# Patient Record
Sex: FEMALE | Race: WHITE | Employment: FULL TIME | URBAN - METROPOLITAN AREA
[De-identification: names, ages, dates, MRNs, and addresses within clinical notes are randomized per-mention and may not be internally consistent; named-entity substitution may affect disease eponyms.]

---

## 2017-10-24 PROBLEM — K44.9 HIATAL HERNIA: Status: ACTIVE | Noted: 2017-10-24

## 2018-01-10 PROBLEM — E78.1 HYPERTRIGLYCERIDEMIA: Status: ACTIVE | Noted: 2018-01-10

## 2018-06-22 ENCOUNTER — HOSPITAL ENCOUNTER (OUTPATIENT)
Dept: MAMMOGRAPHY | Age: 38
Discharge: HOME | End: 2018-06-22
Attending: FAMILY MEDICINE
Payer: COMMERCIAL

## 2018-06-22 DIAGNOSIS — Z12.39 SCREENING FOR BREAST CANCER: ICD-10-CM

## 2018-06-22 PROCEDURE — 77067 SCR MAMMO BI INCL CAD: CPT

## 2019-09-22 ENCOUNTER — APPOINTMENT (OUTPATIENT)
Dept: GENERAL RADIOLOGY | Age: 39
End: 2019-09-22
Attending: EMERGENCY MEDICINE
Payer: COMMERCIAL

## 2019-09-22 ENCOUNTER — HOSPITAL ENCOUNTER (EMERGENCY)
Age: 39
Discharge: HOME | End: 2019-09-22
Attending: STUDENT IN AN ORGANIZED HEALTH CARE EDUCATION/TRAINING PROGRAM
Payer: COMMERCIAL

## 2019-09-22 VITALS
HEART RATE: 87 BPM | DIASTOLIC BLOOD PRESSURE: 83 MMHG | BODY MASS INDEX: 28.61 KG/M2 | TEMPERATURE: 97.9 F | WEIGHT: 178 LBS | OXYGEN SATURATION: 98 % | RESPIRATION RATE: 18 BRPM | HEIGHT: 66 IN | SYSTOLIC BLOOD PRESSURE: 121 MMHG

## 2019-09-22 DIAGNOSIS — R07.9 CHEST PAIN, UNSPECIFIED TYPE: Primary | ICD-10-CM

## 2019-09-22 LAB
ALBUMIN SERPL-MCNC: 3.8 G/DL (ref 3.5–5)
ALBUMIN/GLOB SERPL: 1 {RATIO} (ref 1.2–3.5)
ALP SERPL-CCNC: 142 U/L (ref 50–136)
ALT SERPL-CCNC: 26 U/L (ref 12–65)
ANION GAP SERPL CALC-SCNC: 5 MMOL/L (ref 7–16)
AST SERPL-CCNC: 21 U/L (ref 15–37)
ATRIAL RATE: 80 BPM
BASOPHILS # BLD: 0.1 K/UL (ref 0–0.2)
BASOPHILS NFR BLD: 1 % (ref 0–2)
BILIRUB SERPL-MCNC: 0.2 MG/DL (ref 0.2–1.1)
BUN SERPL-MCNC: 13 MG/DL (ref 6–23)
CALCIUM SERPL-MCNC: 9 MG/DL (ref 8.3–10.4)
CALCULATED P AXIS, ECG09: 24 DEGREES
CALCULATED R AXIS, ECG10: 28 DEGREES
CALCULATED T AXIS, ECG11: 46 DEGREES
CHLORIDE SERPL-SCNC: 109 MMOL/L (ref 98–107)
CO2 SERPL-SCNC: 27 MMOL/L (ref 21–32)
CREAT SERPL-MCNC: 0.76 MG/DL (ref 0.6–1)
DIAGNOSIS, 93000: NORMAL
DIFFERENTIAL METHOD BLD: ABNORMAL
EOSINOPHIL # BLD: 0 K/UL (ref 0–0.8)
EOSINOPHIL NFR BLD: 0 % (ref 0.5–7.8)
ERYTHROCYTE [DISTWIDTH] IN BLOOD BY AUTOMATED COUNT: 12.7 % (ref 11.9–14.6)
GLOBULIN SER CALC-MCNC: 3.7 G/DL (ref 2.3–3.5)
GLUCOSE SERPL-MCNC: 89 MG/DL (ref 65–100)
HCT VFR BLD AUTO: 41.5 % (ref 35.8–46.3)
HGB BLD-MCNC: 13.9 G/DL (ref 11.7–15.4)
IMM GRANULOCYTES # BLD AUTO: 0 K/UL (ref 0–0.5)
IMM GRANULOCYTES NFR BLD AUTO: 0 % (ref 0–5)
LYMPHOCYTES # BLD: 2.9 K/UL (ref 0.5–4.6)
LYMPHOCYTES NFR BLD: 41 % (ref 13–44)
MCH RBC QN AUTO: 29.1 PG (ref 26.1–32.9)
MCHC RBC AUTO-ENTMCNC: 33.5 G/DL (ref 31.4–35)
MCV RBC AUTO: 87 FL (ref 79.6–97.8)
MONOCYTES # BLD: 0.5 K/UL (ref 0.1–1.3)
MONOCYTES NFR BLD: 7 % (ref 4–12)
NEUTS SEG # BLD: 3.6 K/UL (ref 1.7–8.2)
NEUTS SEG NFR BLD: 50 % (ref 43–78)
NRBC # BLD: 0 K/UL (ref 0–0.2)
P-R INTERVAL, ECG05: 150 MS
PLATELET # BLD AUTO: 308 K/UL (ref 150–450)
PMV BLD AUTO: 8.2 FL (ref 9.4–12.3)
POTASSIUM SERPL-SCNC: 3.9 MMOL/L (ref 3.5–5.1)
PROT SERPL-MCNC: 7.5 G/DL (ref 6.3–8.2)
Q-T INTERVAL, ECG07: 378 MS
QRS DURATION, ECG06: 80 MS
QTC CALCULATION (BEZET), ECG08: 435 MS
RBC # BLD AUTO: 4.77 M/UL (ref 4.05–5.2)
SODIUM SERPL-SCNC: 141 MMOL/L (ref 136–145)
TROPONIN I SERPL-MCNC: <0.02 NG/ML (ref 0.02–0.05)
TROPONIN I SERPL-MCNC: <0.02 NG/ML (ref 0.02–0.05)
VENTRICULAR RATE, ECG03: 80 BPM
WBC # BLD AUTO: 7.1 K/UL (ref 4.3–11.1)

## 2019-09-22 PROCEDURE — 93005 ELECTROCARDIOGRAM TRACING: CPT | Performed by: STUDENT IN AN ORGANIZED HEALTH CARE EDUCATION/TRAINING PROGRAM

## 2019-09-22 PROCEDURE — 74011250636 HC RX REV CODE- 250/636: Performed by: STUDENT IN AN ORGANIZED HEALTH CARE EDUCATION/TRAINING PROGRAM

## 2019-09-22 PROCEDURE — 93005 ELECTROCARDIOGRAM TRACING: CPT | Performed by: EMERGENCY MEDICINE

## 2019-09-22 PROCEDURE — 99285 EMERGENCY DEPT VISIT HI MDM: CPT | Performed by: STUDENT IN AN ORGANIZED HEALTH CARE EDUCATION/TRAINING PROGRAM

## 2019-09-22 PROCEDURE — 96361 HYDRATE IV INFUSION ADD-ON: CPT | Performed by: STUDENT IN AN ORGANIZED HEALTH CARE EDUCATION/TRAINING PROGRAM

## 2019-09-22 PROCEDURE — 96374 THER/PROPH/DIAG INJ IV PUSH: CPT | Performed by: STUDENT IN AN ORGANIZED HEALTH CARE EDUCATION/TRAINING PROGRAM

## 2019-09-22 PROCEDURE — 74011250637 HC RX REV CODE- 250/637: Performed by: STUDENT IN AN ORGANIZED HEALTH CARE EDUCATION/TRAINING PROGRAM

## 2019-09-22 PROCEDURE — 71046 X-RAY EXAM CHEST 2 VIEWS: CPT

## 2019-09-22 PROCEDURE — 81003 URINALYSIS AUTO W/O SCOPE: CPT | Performed by: STUDENT IN AN ORGANIZED HEALTH CARE EDUCATION/TRAINING PROGRAM

## 2019-09-22 PROCEDURE — 85025 COMPLETE CBC W/AUTO DIFF WBC: CPT

## 2019-09-22 PROCEDURE — 84484 ASSAY OF TROPONIN QUANT: CPT

## 2019-09-22 PROCEDURE — 80053 COMPREHEN METABOLIC PANEL: CPT

## 2019-09-22 RX ORDER — ONDANSETRON 2 MG/ML
4 INJECTION INTRAMUSCULAR; INTRAVENOUS
Status: COMPLETED | OUTPATIENT
Start: 2019-09-22 | End: 2019-09-22

## 2019-09-22 RX ORDER — IBUPROFEN 800 MG/1
800 TABLET ORAL
Status: COMPLETED | OUTPATIENT
Start: 2019-09-22 | End: 2019-09-22

## 2019-09-22 RX ADMIN — IBUPROFEN 800 MG: 800 TABLET, FILM COATED ORAL at 19:12

## 2019-09-22 RX ADMIN — SODIUM CHLORIDE 1000 ML: 900 INJECTION, SOLUTION INTRAVENOUS at 19:35

## 2019-09-22 RX ADMIN — ONDANSETRON 4 MG: 2 INJECTION INTRAMUSCULAR; INTRAVENOUS at 19:35

## 2019-09-22 NOTE — ED TRIAGE NOTES
Patient comes in via EMS for sob x2 weeks and cp/jaw pain x1 week. EMS placed 1 in nitro paste to chest. Patient states that relieved both sob and cp. Denies pain at this time.  HTN in triage with no history

## 2019-09-22 NOTE — ED PROVIDER NOTES
78-year-old female patient presents to the emergency department with reports of intermittent jaw/neck pain, chest heaviness, shortness of breath and some discomfort in her left arm. The symptoms have been present and intermittent over the past week. She denies any obvious alleviating or exacerbating factors. Chest discomfort is described as a pressure-like sensation that comes at random. Denies previously similar symptoms or previous diagnosis of heart disease. Received some nitroglycerin paste from EMS providers with improvement in her discomfort. She is also been given aspirin. Is no associated nausea, vomiting or diaphoresis. She denies any other symptoms such as fever, chills, abdominal pain or changes in bowel or bladder habits. The history is provided by the patient. No  was used. Shortness of Breath   This is a new problem. The current episode started more than 1 week ago. The problem has been gradually improving. Associated symptoms include neck pain. Pertinent negatives include no fever, no headaches, no sore throat, no cough, no wheezing, no chest pain, no vomiting, no abdominal pain, no rash and no leg swelling. Treatments tried: Nitroglycerin. The treatment provided moderate relief.         Past Medical History:   Diagnosis Date    Hiatal hernia 10/24/2017       Past Surgical History:   Procedure Laterality Date    HX GYN      HX HYSTERECTOMY      HX RECTOCELE REPAIR           Family History:   Problem Relation Age of Onset    Diabetes Mother     Hypertension Mother     Elevated Lipids Mother     Diabetes Father     Hypertension Father     Cancer Father     Cancer Maternal Aunt         breast cancer    Breast Cancer Maternal Aunt        Social History     Socioeconomic History    Marital status:      Spouse name: Not on file    Number of children: Not on file    Years of education: Not on file    Highest education level: Not on file   Occupational History    Not on file   Social Needs    Financial resource strain: Not on file    Food insecurity:     Worry: Not on file     Inability: Not on file    Transportation needs:     Medical: Not on file     Non-medical: Not on file   Tobacco Use    Smoking status: Former Smoker    Smokeless tobacco: Former User   Substance and Sexual Activity    Alcohol use: No    Drug use: No    Sexual activity: Yes     Partners: Male     Birth control/protection: Surgical   Lifestyle    Physical activity:     Days per week: Not on file     Minutes per session: Not on file    Stress: Not on file   Relationships    Social connections:     Talks on phone: Not on file     Gets together: Not on file     Attends Confucianist service: Not on file     Active member of club or organization: Not on file     Attends meetings of clubs or organizations: Not on file     Relationship status: Not on file    Intimate partner violence:     Fear of current or ex partner: Not on file     Emotionally abused: Not on file     Physically abused: Not on file     Forced sexual activity: Not on file   Other Topics Concern    Not on file   Social History Narrative    Not on file         ALLERGIES: Benadryl allergy decongestant; Codeine; Erythromycin; Meperidine hcl; and Pcn [penicillins]    Review of Systems   Constitutional: Negative for chills, diaphoresis and fever. HENT: Negative for congestion, sneezing and sore throat. Eyes: Negative for visual disturbance. Respiratory: Positive for shortness of breath. Negative for cough, chest tightness and wheezing. Cardiovascular: Negative for chest pain and leg swelling. Gastrointestinal: Negative for abdominal pain, blood in stool, diarrhea, nausea and vomiting. Endocrine: Negative for polyuria. Genitourinary: Negative for difficulty urinating, dysuria, flank pain, hematuria and urgency. Musculoskeletal: Positive for neck pain. Negative for back pain, myalgias and neck stiffness. Skin: Negative for color change and rash. Neurological: Negative for dizziness, syncope, speech difficulty, weakness, light-headedness, numbness and headaches. Psychiatric/Behavioral: Negative for behavioral problems. All other systems reviewed and are negative. Vitals:    09/22/19 1511 09/22/19 1656   BP: (!) 140/107 137/86   Pulse: 82    Resp: 16    Temp: 97.9 °F (36.6 °C)    SpO2: 97% 97%   Weight: 80.7 kg (178 lb)    Height: 5' 6\" (1.676 m)             Physical Exam   Constitutional: She is oriented to person, place, and time. She appears well-developed and well-nourished. No distress. Alert and oriented to person place and time. No acute distress, speaks in clear, fluid sentences. HENT:   Head: Normocephalic and atraumatic. Right Ear: External ear normal.   Left Ear: External ear normal.   Nose: Nose normal.   Eyes: Pupils are equal, round, and reactive to light. EOM are normal.   Neck: Normal range of motion. Cardiovascular: Normal rate, regular rhythm, normal heart sounds and intact distal pulses. Exam reveals no gallop and no friction rub. No murmur heard. Pulmonary/Chest: Effort normal and breath sounds normal. No stridor. No respiratory distress. She has no decreased breath sounds. She has no wheezes. She has no rhonchi. She has no rales. She exhibits no tenderness. Abdominal: Soft. She exhibits no distension and no mass. There is no tenderness. There is no rebound and no guarding. No hernia. Musculoskeletal: Normal range of motion. She exhibits no edema, tenderness or deformity. Neurological: She is alert and oriented to person, place, and time. No cranial nerve deficit. Skin: Skin is warm and dry. She is not diaphoretic. Nursing note and vitals reviewed.        MDM  Number of Diagnoses or Management Options  Chest pain, unspecified type: new and requires workup  Diagnosis management comments: Age > 50: NO  HR ? 100: NO  O2 Sat on Room Air < 95%:  NO  Prior History of DVT/PE:NO  Recent Trauma or Surgery: NO  Hemoptysis: NO  Exogenous Estrogen: NO  Unilateral Leg Swelling: NO    If all questions are answered no then pt is PERC negative and is very low risk for PE. No further testing, or imaging is indicated. [address] [facility] A [facility]22/2019  ===================================================================    Patient's pain is improved at this point. She has nitroglycerin paste in place. Initial troponin within normal limits. EKG obtained on arrival shows a normal sinus rhythm without evidence of ischemia. Low suspicion for acute cardiac cause of patient's pain as it has been intermittent in nature for approximately 1 week. Her initial labs are reassuring. She has no diagnosed history of heart disease. She is unsure of family history. Discussed patient case with on-call cardiologist who agrees that patient may follow-up in the office. Discussed this plan of care with patient and family voiced understanding and agreement. Call placed to [address] [place] [address] [address] Card[address] referral line to arrange this appointment. Patient states she has now feeling lightheaded, nauseous and generally ill. Suspect nitro as a cause of patient's symptoms. This is subsequently been removed from patient's chest per . Notified patient's RN who verify that all Nitropaste is gone. She is slightly hypotensive at 515 systolically. Orders placed for Zofran and a liter fluids. We will continue to monitor here.  7:49 PM         Amount and/or Complexity of Data Reviewed  Clinical lab tests: ordered and reviewed  Tests in the radiology section of CPT®: ordered and reviewed  Tests in the medicine section of CPT®: ordered and reviewed  Discuss the patient with other providers: yes  Independent visualization of images, tracings, or specimens: yes    Risk of Complications, Morbidity, and/or Mortality  Presenting problems: moderate  Diagnostic procedures: low  Management options: moderate    Patient Progress  Patient progress: stable         Procedures

## 2019-09-22 NOTE — DISCHARGE INSTRUCTIONS

## 2019-09-23 LAB
ATRIAL RATE: 85 BPM
CALCULATED P AXIS, ECG09: 52 DEGREES
CALCULATED R AXIS, ECG10: 43 DEGREES
CALCULATED T AXIS, ECG11: 55 DEGREES
DIAGNOSIS, 93000: NORMAL
P-R INTERVAL, ECG05: 152 MS
Q-T INTERVAL, ECG07: 356 MS
QRS DURATION, ECG06: 80 MS
QTC CALCULATION (BEZET), ECG08: 423 MS
VENTRICULAR RATE, ECG03: 85 BPM

## 2019-09-23 NOTE — ED NOTES
I have reviewed discharge instructions with the patient. The patient verbalized understanding. Patient left ED via Discharge Method: ambulatory to Home with . Opportunity for questions and clarification provided. Patient given 0 scripts. To continue your aftercare when you leave the hospital, you may receive an automated call from our care team to check in on how you are doing. This is a free service and part of our promise to provide the best care and service to meet your aftercare needs.  If you have questions, or wish to unsubscribe from this service please call [phone]. Thank you for Choosing our [facility] Emergency Department.

## 2019-11-17 RX ORDER — IBUPROFEN 200 MG
CAPSULE ORAL AS NEEDED
COMMUNITY

## 2019-11-18 ENCOUNTER — HOSPITAL ENCOUNTER (OUTPATIENT)
Dept: CARDIAC CATH/INVASIVE PROCEDURES | Age: 39
Discharge: HOME | End: 2019-11-18
Attending: INTERNAL MEDICINE | Admitting: INTERNAL MEDICINE
Payer: COMMERCIAL

## 2019-11-18 VITALS
BODY MASS INDEX: 29.25 KG/M2 | OXYGEN SATURATION: 99 % | WEIGHT: 182 LBS | TEMPERATURE: 98.1 F | SYSTOLIC BLOOD PRESSURE: 138 MMHG | RESPIRATION RATE: 16 BRPM | HEIGHT: 66 IN | HEART RATE: 74 BPM | DIASTOLIC BLOOD PRESSURE: 81 MMHG

## 2019-11-18 LAB
ATRIAL RATE: 70 BPM
CALCULATED P AXIS, ECG09: 30 DEGREES
CALCULATED R AXIS, ECG10: 26 DEGREES
CALCULATED T AXIS, ECG11: 47 DEGREES
DIAGNOSIS, 93000: NORMAL
P-R INTERVAL, ECG05: 152 MS
Q-T INTERVAL, ECG07: 404 MS
QRS DURATION, ECG06: 80 MS
QTC CALCULATION (BEZET), ECG08: 436 MS
VENTRICULAR RATE, ECG03: 70 BPM

## 2019-11-18 PROCEDURE — 93451 RIGHT HEART CATH: CPT

## 2019-11-18 PROCEDURE — 74011250637 HC RX REV CODE- 250/637: Performed by: INTERNAL MEDICINE

## 2019-11-18 PROCEDURE — C1894 INTRO/SHEATH, NON-LASER: HCPCS

## 2019-11-18 PROCEDURE — 99152 MOD SED SAME PHYS/QHP 5/>YRS: CPT

## 2019-11-18 PROCEDURE — 74011000250 HC RX REV CODE- 250: Performed by: INTERNAL MEDICINE

## 2019-11-18 PROCEDURE — 77030029997 HC DEV COM RDL R BND TELE -B

## 2019-11-18 PROCEDURE — 77030040934 HC CATH DIAG DXTERITY MEDT -A

## 2019-11-18 PROCEDURE — 74011636320 HC RX REV CODE- 636/320: Performed by: INTERNAL MEDICINE

## 2019-11-18 PROCEDURE — 93005 ELECTROCARDIOGRAM TRACING: CPT | Performed by: INTERNAL MEDICINE

## 2019-11-18 PROCEDURE — 93458 L HRT ARTERY/VENTRICLE ANGIO: CPT

## 2019-11-18 PROCEDURE — C1751 CATH, INF, PER/CENT/MIDLINE: HCPCS

## 2019-11-18 PROCEDURE — C1769 GUIDE WIRE: HCPCS

## 2019-11-18 PROCEDURE — 74011250636 HC RX REV CODE- 250/636: Performed by: INTERNAL MEDICINE

## 2019-11-18 PROCEDURE — 99153 MOD SED SAME PHYS/QHP EA: CPT

## 2019-11-18 PROCEDURE — 93460 R&L HRT ART/VENTRICLE ANGIO: CPT

## 2019-11-18 RX ORDER — IBUPROFEN 600 MG/1
600 TABLET ORAL ONCE
Status: COMPLETED | OUTPATIENT
Start: 2019-11-18 | End: 2019-11-18

## 2019-11-18 RX ORDER — HEPARIN SODIUM 200 [USP'U]/100ML
3 INJECTION, SOLUTION INTRAVENOUS CONTINUOUS
Status: DISCONTINUED | OUTPATIENT
Start: 2019-11-18 | End: 2019-11-18 | Stop reason: HOSPADM

## 2019-11-18 RX ORDER — ONDANSETRON 2 MG/ML
4 INJECTION INTRAMUSCULAR; INTRAVENOUS ONCE
Status: COMPLETED | OUTPATIENT
Start: 2019-11-18 | End: 2019-11-18

## 2019-11-18 RX ORDER — MIDAZOLAM HYDROCHLORIDE 1 MG/ML
.5-2 INJECTION, SOLUTION INTRAMUSCULAR; INTRAVENOUS
Status: DISCONTINUED | OUTPATIENT
Start: 2019-11-18 | End: 2019-11-18 | Stop reason: HOSPADM

## 2019-11-18 RX ORDER — SODIUM CHLORIDE 9 MG/ML
75 INJECTION, SOLUTION INTRAVENOUS CONTINUOUS
Status: DISCONTINUED | OUTPATIENT
Start: 2019-11-18 | End: 2019-11-18 | Stop reason: HOSPADM

## 2019-11-18 RX ORDER — LIDOCAINE HYDROCHLORIDE 10 MG/ML
2-20 INJECTION INFILTRATION; PERINEURAL ONCE
Status: COMPLETED | OUTPATIENT
Start: 2019-11-18 | End: 2019-11-18

## 2019-11-18 RX ORDER — GUAIFENESIN 100 MG/5ML
81-324 LIQUID (ML) ORAL ONCE
Status: DISCONTINUED | OUTPATIENT
Start: 2019-11-18 | End: 2019-11-18 | Stop reason: HOSPADM

## 2019-11-18 RX ADMIN — LIDOCAINE HYDROCHLORIDE 3 ML: 10 INJECTION, SOLUTION INFILTRATION; PERINEURAL at 14:00

## 2019-11-18 RX ADMIN — IBUPROFEN 600 MG: 600 TABLET, FILM COATED ORAL at 15:40

## 2019-11-18 RX ADMIN — MIDAZOLAM 2 MG: 1 INJECTION INTRAMUSCULAR; INTRAVENOUS at 14:08

## 2019-11-18 RX ADMIN — IOPAMIDOL 40 ML: 755 INJECTION, SOLUTION INTRAVENOUS at 14:30

## 2019-11-18 RX ADMIN — HEPARIN SODIUM 3 ML/HR: 5000 INJECTION, SOLUTION INTRAVENOUS; SUBCUTANEOUS at 14:08

## 2019-11-18 RX ADMIN — SODIUM CHLORIDE 75 ML/HR: 900 INJECTION, SOLUTION INTRAVENOUS at 13:16

## 2019-11-18 RX ADMIN — HEPARIN SODIUM 2 ML: 10000 INJECTION, SOLUTION INTRAVENOUS; SUBCUTANEOUS at 14:08

## 2019-11-18 RX ADMIN — ONDANSETRON 4 MG: 2 INJECTION INTRAMUSCULAR; INTRAVENOUS at 16:29

## 2019-11-18 NOTE — PROGRESS NOTES
Report received from [address] [place] [address]. Procedural findings communicated. Intra procedural  medication administration reviewed. Progression of care discussed.      Patient received into [address] [facility] 4 post sheath removal.     Access site without bleeding or swelling yes    Dressing dry and intact yes    Patient instructed to limit movement to right upper extremity    Routine post procedural vital signs and site assessment initiated yes

## 2019-11-18 NOTE — PROGRESS NOTES
Patient pre-assessment complete for [facility] poss with DR Valencia scheduled for 19 at 12:30pm, arrival time 10:30am. Patient verified using . Patient instructed to bring all home medications in labeled bottles on the day of procedure. NPO status reinforced. Patient informed to take a full dose aspirin 325mg  or 81 mg x 4 on the day of procedure. Instructed they can take all other medications excluding vitamins & supplements. Patient verbalizes understanding of all instructions & denies any questions at this time.

## 2019-11-18 NOTE — PROGRESS NOTES
Pt arrived, ambulated to room with no visible problems, planned C for Dr Shari Peabody. Consent signed, Procedure discussed with pt all questions answered voiced understanding. Medications and history discussed with pt. Pt prepped per ordersThe patient has a fraility score of 3-MANAGING WELL, based on ability to complete ADLs without assistance, increased symptoms on exertion.       Patient took Aspirin 324mg today at 1000 prior to arrival.

## 2019-11-18 NOTE — DISCHARGE INSTRUCTIONS
HEART CATHETERIZATION/ANGIOGRAPHY DISCHARGE INSTRUCTIONS    1. Check puncture site frequently for swelling or bleeding. If there is any bleeding, lie down and apply pressure over the area with a clean towel or washcloth. Notify your doctor for any redness, swelling, drainage, or oozing from the puncture site. Notify your doctor for any fever or chills. 2. If the extremity becomes cold, numb, or painful call Dr. Eder Garcia at [phone].  3. Activity should be limited for the next 48 hours. Do not use your right arm for lifting, pushing, or pulling during this time. 4. You may resume your usual diet. Drink more fluids than usual.  5. Have a responsible person drive you home and stay with you for at least 24 hours after your heart catheterization/angiography. 6. You may remove bandages from your Right arm in 24 hours. You may shower in 24 hours. No tub baths, hot tubs, or swimming for 1 week. Do not place any lotions, creams, powders, or ointments over puncture site for 1 week. You may place a clean band-aid over the puncture site each day for 5 days. Change daily. 7. Do not drive for 24 hours. I have read the above instructions and have had the opportunity to ask questions.       Patient: ________________________   Date: 11/18/2019    Witness: _______________________   Date: 11/18/2019

## 2019-11-18 NOTE — PROCEDURES
Cardiac Catheterization Procedure Note    Patient ID:     Name: Dreama Hashimoto   Medical Record Number: [id number]   YOB: 1980    Date of Procedure: 11/18/2019     Pre-procedure Diagnosis:  Typical Angina    Post-procedure Diagnosis: Non-cardiac Chest Pain    Reason for Procedure: Suspected CAD    Blood loss less than 5 ml    Sedation. Pt received 2 mg versed and 0 mcg fentanyl for monitored conscious sedation from 200to 230. Nurse broderick    Specimen: None    No complications    No assistants    Time out, Mallampati, and ASA performed    Procedure:  After informed consent, patient was prepped and draped in the usual sterile fashion. radial approach was used. 70cc Visipaque contrast were utilized for the entire procedure.  no closure device used        FINDINGS    Left Ventricle: 65  LVEDP: 18    Left Main:normal    Left Anterior descending coronary artery: normal       Left Circumflex coronary artery: normal        Right coronary artery: normal            Graft anatomy: na    Intervention if done: na    Conclusions: right heart cath normal    Recommentations: med tx    No complications      Signed By: Willis Urbina MD

## 2019-11-18 NOTE — PROGRESS NOTES
Discharged to home accompanied by . Left unit via wheelchair. Right radial and brachial sites without bleeding or hematoma.

## 2019-11-18 NOTE — PROGRESS NOTES
R band deflation completed. Right radial dressed with sterile gauze covered with tegaderm using sterile technique. No bleeding or hematoma. Tolerated without difficulty  Discharge instructions given per orders, voiced good understanding of right radial and brachial care, medications & follow up care.  Denies any questions

## 2019-11-18 NOTE — PROGRESS NOTES
TRANSFER - OUT REPORT:    Verbal report given to Yazmin Brown RN(name) on Carry Florina  being transferred to CPRU(unit) for routine progression of care       Report consisted of patients Situation, Background, Assessment and   Recommendations(SBAR). Information from the following report(s) SBAR was reviewed with the receiving nurse.     C and C w/ Dr. Sav Manzo  No interventions  2 mg versed   7 Fr venoush sheath R brachial   R radial TR band 12 mls

## 2019-11-18 NOTE — PROGRESS NOTES
Unable to obtain complete pain relief from headache. States she usually only takes ibuprofen but thinks the headache started due to not being able to eat until later in the day.

## 2019-11-19 NOTE — PROCEDURES
[facility]  CARDIAC CATH    Name:  Trevon Dietz  MR#:  [id number]  :  1980  ACCOUNT #:  [de-identified]  DATE OF SERVICE:  2019    REFERRING PHYSICIAN:  Colten Brady MD    PROCEDURES PERFORMED:  Left heart cath, right heart cath, selective coronary angiography, left ventriculogram.    PREOPERATIVE DIAGNOSES:  Shortness of breath, chest pain. POSTOPERATIVE DIAGNOSIS:  Noncardiac symptoms. SURGEON:  Yolette Quiles MD    ASSISTANT:  None. ESTIMATED BLOOD LOSS:  3 mL    SPECIMENS REMOVED:  None. COMPLICATIONS:  none    IMPLANTS:  None. ANESTHESIA:  Sedation 2 mg of Versed. Anesthesia 2 mg of Versed given between 2:00 and 2:30 p.m. by Stephanie Garza RN. INDICATION:  Shortness of breath. ACCESS:  Right radial and right brachial.    CONTRAST:  70 mL    CATHETERS USED:  TIG-4 and [place] were used. FINDINGS:  Left heart cath shows LV gram 65% to 70% for an EF with LVEDP of 18. No gradient on pullback. Left main arises normally, bifurcates into LAD and circumflex. Left main is moderate size and normal.    LAD courses to the apex, supplies two diagonals. The LAD and diagonals are moderate-caliber vessels that are smooth and have no atherosclerotic disease. The LAD wraps to the apex. Circumflex artery in the AV groove is nondominant but supplies two OMs. The circ system is moderate to large size and is angiographically normal.    Right coronary artery is a large dominant artery arising off the right cusp, coursing in the AV groove, supplying an RV branch, posterolateral and a posterior descending. The right system is angiographically normal.    Right heart cath shows the following:  RA pressure:  A-wave 13, V-wave 12, mean of 10. RV pressure:  Systolic 30, diastolic 12. PA pressure:  Systolic 23, diastolic 12. Pulmonary capillary wedge pressure:  A-wave 17, V-wave 17, mean of 15. LV pressure 133/48. AO pressure 130/113.     LVEDP of 18.    Cardiac output was approximately 5.6 liters per minute. Saturations:  Right atrial saturation 76%. PA sat 76%. CONCLUSIONS:  Normal left and right heart catheterization with normal left ventricular systolic function. Continue medical therapy.       MD GISELLE [id number]  D:  11/18/2019 14:57  T:  11/19/2019 8:01  JOB #:  [id number]

## 2019-11-27 ENCOUNTER — HOSPITAL ENCOUNTER (OUTPATIENT)
Dept: MAMMOGRAPHY | Age: 39
Discharge: HOME | End: 2019-11-27
Attending: FAMILY MEDICINE
Payer: COMMERCIAL

## 2019-11-27 DIAGNOSIS — Z12.31 VISIT FOR SCREENING MAMMOGRAM: ICD-10-CM

## 2019-11-27 PROCEDURE — 77063 BREAST TOMOSYNTHESIS BI: CPT

## 2019-12-10 ENCOUNTER — HOSPITAL ENCOUNTER (OUTPATIENT)
Dept: GENERAL RADIOLOGY | Age: 39
Discharge: HOME | End: 2019-12-10
Payer: COMMERCIAL

## 2019-12-10 DIAGNOSIS — R06.02 SOB (SHORTNESS OF BREATH): ICD-10-CM

## 2019-12-10 PROCEDURE — 71046 X-RAY EXAM CHEST 2 VIEWS: CPT

## 2021-01-13 ENCOUNTER — TRANSCRIBE ORDER (OUTPATIENT)
Dept: SCHEDULING | Age: 41
End: 2021-01-13

## 2021-01-13 DIAGNOSIS — Z12.31 SCREENING MAMMOGRAM FOR HIGH-RISK PATIENT: Primary | ICD-10-CM

## 2021-01-21 ENCOUNTER — HOSPITAL ENCOUNTER (OUTPATIENT)
Dept: MAMMOGRAPHY | Age: 41
Discharge: HOME | End: 2021-01-21
Attending: OBSTETRICS & GYNECOLOGY
Payer: COMMERCIAL

## 2021-01-21 DIAGNOSIS — Z12.31 SCREENING MAMMOGRAM FOR HIGH-RISK PATIENT: ICD-10-CM

## 2021-01-21 PROCEDURE — 77063 BREAST TOMOSYNTHESIS BI: CPT

## 2022-03-19 PROBLEM — K44.9 HIATAL HERNIA: Status: ACTIVE | Noted: 2017-10-24

## 2022-03-19 PROBLEM — E78.1 HYPERTRIGLYCERIDEMIA: Status: ACTIVE | Noted: 2018-01-10

## 2022-03-21 ENCOUNTER — HOSPITAL ENCOUNTER (OUTPATIENT)
Dept: MAMMOGRAPHY | Age: 42
Discharge: HOME | End: 2022-03-21
Attending: FAMILY MEDICINE
Payer: COMMERCIAL

## 2022-03-21 DIAGNOSIS — Z12.39 SCREENING BREAST EXAMINATION: ICD-10-CM

## 2022-03-21 PROCEDURE — 77063 BREAST TOMOSYNTHESIS BI: CPT

## 2023-02-28 ENCOUNTER — APPOINTMENT (OUTPATIENT)
Dept: GENERAL RADIOLOGY | Age: 43
End: 2023-02-28
Payer: COMMERCIAL

## 2023-02-28 ENCOUNTER — HOSPITAL ENCOUNTER (EMERGENCY)
Age: 43
Discharge: HOME | End: 2023-02-28
Attending: EMERGENCY MEDICINE
Payer: COMMERCIAL

## 2023-02-28 ENCOUNTER — TELEPHONE (OUTPATIENT)
Dept: CARDIOLOGY CLINIC | Age: 43
End: 2023-02-28

## 2023-02-28 VITALS
SYSTOLIC BLOOD PRESSURE: 121 MMHG | TEMPERATURE: 97.7 F | WEIGHT: 165 LBS | BODY MASS INDEX: 26.52 KG/M2 | OXYGEN SATURATION: 100 % | HEIGHT: 66 IN | RESPIRATION RATE: 14 BRPM | DIASTOLIC BLOOD PRESSURE: 105 MMHG | HEART RATE: 75 BPM

## 2023-02-28 DIAGNOSIS — R06.02 SHORTNESS OF BREATH: ICD-10-CM

## 2023-02-28 DIAGNOSIS — R07.9 CHEST PAIN, UNSPECIFIED TYPE: Primary | ICD-10-CM

## 2023-02-28 LAB
ALBUMIN SERPL-MCNC: 3.6 G/DL (ref 3.5–5)
ALBUMIN/GLOB SERPL: 0.9 (ref 0.4–1.6)
ALP SERPL-CCNC: 112 U/L (ref 50–136)
ALT SERPL-CCNC: 38 U/L (ref 12–65)
ANION GAP SERPL CALC-SCNC: 4 MMOL/L (ref 2–11)
AST SERPL-CCNC: 17 U/L (ref 15–37)
BASOPHILS # BLD: 0.1 K/UL (ref 0–0.2)
BASOPHILS NFR BLD: 1 % (ref 0–2)
BILIRUB SERPL-MCNC: 0.2 MG/DL (ref 0.2–1.1)
BILIRUB UR QL: NEGATIVE
BUN SERPL-MCNC: 15 MG/DL (ref 6–23)
CALCIUM SERPL-MCNC: 9.8 MG/DL (ref 8.3–10.4)
CHLORIDE SERPL-SCNC: 107 MMOL/L (ref 101–110)
CO2 SERPL-SCNC: 27 MMOL/L (ref 21–32)
CREAT SERPL-MCNC: 0.9 MG/DL (ref 0.6–1)
D DIMER PPP FEU-MCNC: 0.47 UG/ML(FEU)
DIFFERENTIAL METHOD BLD: ABNORMAL
EKG ATRIAL RATE: 81 BPM
EKG DIAGNOSIS: NORMAL
EKG P AXIS: 65 DEGREES
EKG P-R INTERVAL: 148 MS
EKG Q-T INTERVAL: 364 MS
EKG QRS DURATION: 78 MS
EKG QTC CALCULATION (BAZETT): 422 MS
EKG R AXIS: 49 DEGREES
EKG T AXIS: 66 DEGREES
EKG VENTRICULAR RATE: 81 BPM
EOSINOPHIL # BLD: 0 K/UL (ref 0–0.8)
EOSINOPHIL NFR BLD: 0 % (ref 0.5–7.8)
ERYTHROCYTE [DISTWIDTH] IN BLOOD BY AUTOMATED COUNT: 12.9 % (ref 11.9–14.6)
GLOBULIN SER CALC-MCNC: 3.9 G/DL (ref 2.8–4.5)
GLUCOSE SERPL-MCNC: 101 MG/DL (ref 65–100)
GLUCOSE UR QL STRIP.AUTO: NEGATIVE MG/DL
HCG, URINE, POC: NEGATIVE
HCT VFR BLD AUTO: 43.4 % (ref 35.8–46.3)
HGB BLD-MCNC: 14 G/DL (ref 11.7–15.4)
IMM GRANULOCYTES # BLD AUTO: 0 K/UL (ref 0–0.5)
IMM GRANULOCYTES NFR BLD AUTO: 0 % (ref 0–5)
KETONES UR-MCNC: NEGATIVE MG/DL
LEUKOCYTE ESTERASE UR QL STRIP: ABNORMAL
LIPASE SERPL-CCNC: 201 U/L (ref 73–393)
LYMPHOCYTES # BLD: 2.5 K/UL (ref 0.5–4.6)
LYMPHOCYTES NFR BLD: 35 % (ref 13–44)
Lab: NORMAL
MAGNESIUM SERPL-MCNC: 2.1 MG/DL (ref 1.8–2.4)
MCH RBC QN AUTO: 28.5 PG (ref 26.1–32.9)
MCHC RBC AUTO-ENTMCNC: 32.3 G/DL (ref 31.4–35)
MCV RBC AUTO: 88.4 FL (ref 82–102)
MONOCYTES # BLD: 0.4 K/UL (ref 0.1–1.3)
MONOCYTES NFR BLD: 5 % (ref 4–12)
NEGATIVE QC PASS/FAIL: NORMAL
NEUTS SEG # BLD: 4.1 K/UL (ref 1.7–8.2)
NEUTS SEG NFR BLD: 59 % (ref 43–78)
NITRITE UR QL: NEGATIVE
NRBC # BLD: 0 K/UL (ref 0–0.2)
PH UR: 5.5 (ref 5–9)
PLATELET # BLD AUTO: 281 K/UL (ref 150–450)
PMV BLD AUTO: 7.8 FL (ref 9.4–12.3)
POSITIVE QC PASS/FAIL: NORMAL
POTASSIUM SERPL-SCNC: 3.9 MMOL/L (ref 3.5–5.1)
PROT SERPL-MCNC: 7.5 G/DL (ref 6.3–8.2)
PROT UR QL: NEGATIVE MG/DL
RBC # BLD AUTO: 4.91 M/UL (ref 4.05–5.2)
RBC # UR STRIP: NEGATIVE
SARS-COV-2 RDRP RESP QL NAA+PROBE: NOT DETECTED
SODIUM SERPL-SCNC: 138 MMOL/L (ref 133–143)
SOURCE: NORMAL
SP GR UR: 1.02 (ref 1–1.02)
TROPONIN I SERPL HS-MCNC: <3 PG/ML (ref 0–14)
TSH, 3RD GENERATION: 1.11 UIU/ML (ref 0.36–3.74)
UROBILINOGEN UR QL: 0.2 EU/DL (ref 0.2–1)
WBC # BLD AUTO: 7.1 K/UL (ref 4.3–11.1)

## 2023-02-28 PROCEDURE — 6370000000 HC RX 637 (ALT 250 FOR IP): Performed by: NURSE PRACTITIONER

## 2023-02-28 PROCEDURE — 93005 ELECTROCARDIOGRAM TRACING: CPT | Performed by: NURSE PRACTITIONER

## 2023-02-28 PROCEDURE — 84484 ASSAY OF TROPONIN QUANT: CPT

## 2023-02-28 PROCEDURE — 83690 ASSAY OF LIPASE: CPT

## 2023-02-28 PROCEDURE — 84443 ASSAY THYROID STIM HORMONE: CPT

## 2023-02-28 PROCEDURE — 85379 FIBRIN DEGRADATION QUANT: CPT

## 2023-02-28 PROCEDURE — 71046 X-RAY EXAM CHEST 2 VIEWS: CPT

## 2023-02-28 PROCEDURE — 83735 ASSAY OF MAGNESIUM: CPT

## 2023-02-28 PROCEDURE — 85025 COMPLETE CBC W/AUTO DIFF WBC: CPT

## 2023-02-28 PROCEDURE — 99285 EMERGENCY DEPT VISIT HI MDM: CPT | Performed by: EMERGENCY MEDICINE

## 2023-02-28 PROCEDURE — 81003 URINALYSIS AUTO W/O SCOPE: CPT

## 2023-02-28 PROCEDURE — 87635 SARS-COV-2 COVID-19 AMP PRB: CPT

## 2023-02-28 PROCEDURE — 80053 COMPREHEN METABOLIC PANEL: CPT

## 2023-02-28 RX ORDER — ASPIRIN 81 MG/1
324 TABLET, CHEWABLE ORAL
Status: COMPLETED | OUTPATIENT
Start: 2023-02-28 | End: 2023-02-28

## 2023-02-28 RX ADMIN — ASPIRIN 324 MG: 81 TABLET, CHEWABLE ORAL at 12:45

## 2023-02-28 ASSESSMENT — PAIN DESCRIPTION - LOCATION: LOCATION: CHEST

## 2023-02-28 ASSESSMENT — PAIN - FUNCTIONAL ASSESSMENT: PAIN_FUNCTIONAL_ASSESSMENT: 0-10

## 2023-02-28 ASSESSMENT — LIFESTYLE VARIABLES
HOW MANY STANDARD DRINKS CONTAINING ALCOHOL DO YOU HAVE ON A TYPICAL DAY: 1 OR 2
HOW OFTEN DO YOU HAVE A DRINK CONTAINING ALCOHOL: MONTHLY OR LESS

## 2023-02-28 ASSESSMENT — PAIN SCALES - GENERAL: PAINLEVEL_OUTOF10: 5

## 2023-02-28 NOTE — ED TRIAGE NOTES
Pt c/o bilateral neck pain [id number] now resolved then onset L chest squeezing pain today with arm pain and weakness (-)abd pain, dyspnea, cough   A&Ox4

## 2023-02-28 NOTE — ED PROVIDER NOTES
Emergency Department Provider Note                   PCP:                Ila San MD               Age: 38 y.o. Sex: female       ICD-10-CM    1. Chest pain, unspecified type  R07.9 Sanjiv Avila MD, Cardiology, [place]      2. Shortness of breath  R06.02 [facility] Peter Hogue MD, Cardiology, [address]    Discharged    Medical Decision Making  Amount and/or Complexity of Data Reviewed  Labs: ordered. Radiology: ordered. ECG/medicine tests: ordered. Risk  OTC drugs. As in HPI. 68-year-old female with chest pain that began yesterday, has been present for the last 7-8 hours today prior to arrival.  Endorses chest pain that radiates to the left chest and into both arms. Denies any other current complaint but notes that in recent months she has had episodic shortness of breath, episodic upper back pain, neck/throat pain/tightness. Patient denies all other complaints. No new or worsening symptoms in the ED. Resting comfortably. EKG was reassuring, negative troponin as pain has been present for than 6 hours, do not feel repeat troponin indicated. Patient's labs were reviewed, negative D-dimer. Chest x-ray with no acute process. Discussed all results with the patient. Noted to be hypertensive in the ED. She declines offer of medication for this, pain or for other. Indication of endorgan damage. Have low clinical suspicion of stroke, ACS, PE/DVT, aortic aneurysm, dissection, pneumothorax or other acute emergent process. I feel she is stable for discharge home. I placed referral with cardiology and advised that she follow-up with her PCP tomorrow. Discussed and advised watchful for gave strict return precautions. Discussed with ED attending. Patient is well-hydrated appearing, no distress. Nontoxic-appearing, tolerating oral intake, hemodynamically stable. All findings and plan were discussed with the patient. All questions answered. Discussed with the patient that an unremarkable evaluation in the ED does not preclude the development or presence of a serious or life threatening condition. Patient was instructed to return immediately for any worsening or change in current symptoms, or if symptoms do not continue to improve. I instructed them to follow up with their primary care provider, own specialist, or medical provider that I am recommending for him within the next 2-3 days  The patient acknowledged understanding plan of care and affirmed approval.     Signed by: YASSINE Hernandes     This note created using Dragon voice recognition software. Please excuse any accidental errors associated with its use, as note has not been fully proofread and edited. Complexity of Problem: 1 undiagnosed new problem with uncertain prognosis. (4)  The patients assessment required an independent historian: I spoke with a family member. I independently ordered and reviewed the EKG. I reviewed records from an external source: ED records from outside this hospital.  Considerations: Shared decision making was utilized in the care of this patient. Evidence based risk calculation performed: HEART score. Nicole Carrero  We discussed care recommended by provider that patient declined (tests, disposition, etc). Patient was discharged risks and benefits of hospitalization were considered.          Orders Placed This Encounter   Procedures    COVID-19, Rapid    XR CHEST (2 VW)    TSH    CMP    CBC with Auto Differential    Magnesium    Lipase    Troponin    POCT Urine Dipstick    POC Pregnancy Urine Qual    EKG 12 Lead        Medications - No data to display    New Prescriptions    No medications on file        Margarita Flynn is a 38 y.o. female who presents to the Emergency Department with chief complaint of    Chief Complaint   Patient presents with    Chest Pain      HPI  61-year-old female presents to the emergency department with complaints of central chest pain that radiates into the left side of her chest into her bilateral upper arms. States she has occasional shortness of breath and feels that she is difficult time \"take a deep breath. \"  States that the symptoms began yesterday and have been intermittent since that time. States that while experiencing the symptoms yesterday she also noticed a \"squeezing sensation,\" in her throat that she states has been present for the last 1-2 months, but resolved yesterday without return. Patient states she has had no cough, hemoptysis, no CANDELARIO or exertional complaint, no back pain, no abdominal pain, no nausea or vomiting, no syncope or near syncope, dizziness or lightheadedness, difficulty swallowing, nausea/vomiting/diarrhea or urinary complaint,, no lower extremity swelling, numbness or tingling edema. States that she had a heart cath approximately 2 years ago with [place] [facility], presenting for similar symptoms. Patient called the cardiology office today and advised her to come to the ED. On arrival she is ambulatory without increased effort, she conversant at length without any increased respiratory effort. She is not hypoxic, tachypneic or tachycardic. She is afebrile, well-appearing, skin is warm and dry. States that her chest pain has been present since waking this morning. Review of Systems  As in HPI. Otherwise negative.     Past Medical History:   Diagnosis Date    GERD (gastroesophageal reflux disease)     Hiatal hernia 10/24/2017        Past Surgical History:   Procedure Laterality Date    GYN      HYSTERECTOMY (CERVIX STATUS UNKNOWN)      RECTOCELE REPAIR          Family History   Problem Relation Age of Onset    Diabetes Mother     Hypertension Mother     Elevated Lipids Mother     Diabetes Father     Hypertension Father     Breast Cancer Maternal Aunt 39    Cancer Father     Cancer Maternal Aunt         breast cancer        Social History     Socioeconomic History    Marital status:      Spouse name: None    Number of children: None    Years of education: None    Highest education level: None   Tobacco Use    Smoking status: Former     Packs/day: 1.50     Types: Cigarettes     Quit date: 2010     Years since quittin.1    Smokeless tobacco: Never   Substance and Sexual Activity    Alcohol use: No    Drug use: No        Allergies: Latex, Erythromycin, Meperidine, Penicillins, and Codeine    Previous Medications    IBUPROFEN (ADVIL;MOTRIN) 200 MG CAPS    Take by mouth as needed        Vitals signs and nursing note reviewed. Patient Vitals for the past 4 hrs:   Temp Pulse Resp BP SpO2   23 1051 97.7 °F (36.5 °C) 84 14 (!) 124/97 98 %          Physical Exam   Constitutional: Oriented to person, place, and time. Appears well-developed and well-nourished. No distress. HENT:    Head: Normocephalic and atraumatic   Right Ear: External ear normal.    Left Ear: External ear normal.     Nose: Nose normal.   Mouth/Throat: Mouth normal.    Eyes: Conjunctivae are normal.   Neck: Supple. No tracheal deviation. Cardiovascular: Normal rate, intact distal pulses. Brisk capillary refill intact, less than 2 seconds. Regular rhythm present. No pitting edema. Pulmonary/Chest: Lungs are clear & equal bilaterally. No adventitious sounds. No respiratory distress. Abdominal: Soft. There is no tenderness. Musculoskeletal: No obvious deformity, erythema, edema. Neurological: Alert and oriented to person, place, and time. No numbness/tingling. No loss of sensation. Positive PMS ×4. GCS= 15. Skin: Skin is warm and dry. Capillary refill takes less than 2 seconds. No abrasion, no lesion, no petechiae and no rash noted. Not diaphoretic. No cyanosis, erythema, or pallor. Psychiatric: Normal mood and affect. Behavior is normal.    Nursing note and vitals reviewed.      Procedures    ED EKG Interpretation  EKG was interpreted in the absence of a cardiologist.    Rate: 81  EKG Interpretation: EKG Interpretation: sinus rhythm  ST Segments: NO STEMI    Is this patient to be included in the SEP-1 core measure due to severe sepsis or septic shock? No Exclusion criteria - the patient is NOT to be included for SEP-1 Core Measure due to: Infection is not suspected     Results for orders placed or performed during the hospital encounter of 02/28/23   COVID-19, Rapid    Specimen: Nasopharyngeal   Result Value Ref Range    Source NASAL      SARS-CoV-2, Rapid Not detected NOTD     XR CHEST (2 VW)    Narrative    EXAMINATION: XR CHEST (2 VW) 2/28/2023 11:35 AM    ACCESSION NUMBER: [id number]    COMPARISON: Chest radiograph 12/10/2019, 9/22/2019. INDICATION: Chest pain. TECHNIQUE: PA and lateral views of the chest were obtained. FINDINGS:   No focal consolidation. No pulmonary edema. No pneumothorax or sizable pleural effusion. Unremarkable cardiomediastinal silhouette. Impression    No acute airspace disease.      TSH   Result Value Ref Range    TSH, 3RD GENERATION 1.110 0.358 - 3.740 uIU/mL   CMP   Result Value Ref Range    Sodium 138 133 - 143 mmol/L    Potassium 3.9 3.5 - 5.1 mmol/L    Chloride 107 101 - 110 mmol/L    CO2 27 21 - 32 mmol/L    Anion Gap 4 2 - 11 mmol/L    Glucose 101 (H) 65 - 100 mg/dL    BUN 15 6 - 23 MG/DL    Creatinine 0.90 0.6 - 1.0 MG/DL    Est, Glom Filt Rate >60 >60 ml/min/1.73m2    Calcium 9.8 8.3 - 10.4 MG/DL    Total Bilirubin 0.2 0.2 - 1.1 MG/DL    ALT 38 12 - 65 U/L    AST 17 15 - 37 U/L    Alk Phosphatase 112 50 - 136 U/L    Total Protein 7.5 6.3 - 8.2 g/dL    Albumin 3.6 3.5 - 5.0 g/dL    Globulin 3.9 2.8 - 4.5 g/dL    Albumin/Globulin Ratio 0.9 0.4 - 1.6     CBC with Auto Differential   Result Value Ref Range    WBC 7.1 4.3 - 11.1 K/uL    RBC 4.91 4.05 - 5.2 M/uL    Hemoglobin 14.0 11.7 - 15.4 g/dL    Hematocrit 43.4 35.8 - 46.3 %    MCV 88.4 82 - 102 FL    MCH 28.5 26.1 - 32.9 PG    MCHC 32.3 31.4 - 35.0 g/dL    RDW 12.9 11.9 - 14.6 % Platelets 331 884 - 577 K/uL    MPV 7.8 (L) 9.4 - 12.3 FL    nRBC 0.00 0.0 - 0.2 K/uL    Differential Type AUTOMATED      Seg Neutrophils 59 43 - 78 %    Lymphocytes 35 13 - 44 %    Monocytes 5 4.0 - 12.0 %    Eosinophils % 0 (L) 0.5 - 7.8 %    Basophils 1 0.0 - 2.0 %    Immature Granulocytes 0 0.0 - 5.0 %    Segs Absolute 4.1 1.7 - 8.2 K/UL    Absolute Lymph # 2.5 0.5 - 4.6 K/UL    Absolute Mono # 0.4 0.1 - 1.3 K/UL    Absolute Eos # 0.0 0.0 - 0.8 K/UL    Basophils Absolute 0.1 0.0 - 0.2 K/UL    Absolute Immature Granulocyte 0.0 0.0 - 0.5 K/UL   Magnesium   Result Value Ref Range    Magnesium 2.1 1.8 - 2.4 mg/dL   Lipase   Result Value Ref Range    Lipase 201 73 - 393 U/L   Troponin   Result Value Ref Range    Troponin, High Sensitivity <3.0 0 - 14 pg/mL   D-Dimer, Quantitative   Result Value Ref Range    D-Dimer, Quant 0.47 <0.56 ug/ml(FEU)   POC Pregnancy Urine Qual   Result Value Ref Range    HCG, Urine, POC Negative Negative    Lot Number [id number]     Positive QC Pass/Fail Pass     Negative QC Pass/Fail Pass    POCT Urinalysis no Micro   Result Value Ref Range    Specific Gravity, Urine, POC 1.020 1.001 - 1.023      pH, Urine, POC 5.5 5.0 - 9.0      Protein, Urine, POC Negative NEG mg/dL    Glucose, UA POC Negative NEG mg/dL    Ketones, Urine, POC Negative NEG mg/dL    Bilirubin, Urine, POC Negative NEG      Blood, UA POC Negative NEG      URINE UROBILINOGEN POC 0.2 0.2 - 1.0 EU/dL    Nitrite, Urine, POC Negative NEG      Leukocyte Est, UA POC SMALL (A) NEG     EKG 12 Lead   Result Value Ref Range    Ventricular Rate 81 BPM    Atrial Rate 81 BPM    P-R Interval 148 ms    QRS Duration 78 ms    Q-T Interval 364 ms    QTc Calculation (Bazett) 422 ms    P Axis 65 degrees    R Axis 49 degrees    T Axis 66 degrees    Diagnosis Normal sinus rhythm         XR CHEST (2 VW)   Final Result   No acute airspace disease. Voice dictation software was used during the making of this note.   This software is not perfect and grammatical and other typographical errors may be present. This note has not been completely proofread for errors.      CARLOS Beck - CNP  02/28/23 [id number]

## 2023-02-28 NOTE — TELEPHONE ENCOUNTER
Pt c/o pain in both sides of her neck x 1 month and developed intermittent sharp pain and tightness in chest; worsens anytime she gets up to do something. BP has been good. 117/80. HR skipping. Having CP now. Pt is at [facility]. Triage informed pt ER physician will start evaluation and call our doctors in if needed.

## 2023-03-24 ENCOUNTER — TRANSCRIBE ORDERS (OUTPATIENT)
Dept: SCHEDULING | Age: 43
End: 2023-03-24

## 2023-03-24 DIAGNOSIS — Z12.31 ENCOUNTER FOR SCREENING MAMMOGRAM FOR MALIGNANT NEOPLASM OF BREAST: Primary | ICD-10-CM

## 2023-09-13 DIAGNOSIS — R06.02 SHORTNESS OF BREATH: Primary | ICD-10-CM

## 2023-10-02 ENCOUNTER — OFFICE VISIT (OUTPATIENT)
Dept: PULMONOLOGY | Age: 43
End: 2023-10-02
Payer: COMMERCIAL

## 2023-10-02 VITALS
RESPIRATION RATE: 18 BRPM | HEIGHT: 66 IN | SYSTOLIC BLOOD PRESSURE: 124 MMHG | WEIGHT: 183 LBS | DIASTOLIC BLOOD PRESSURE: 88 MMHG | HEART RATE: 101 BPM | BODY MASS INDEX: 29.41 KG/M2 | OXYGEN SATURATION: 97 % | TEMPERATURE: 97.1 F

## 2023-10-02 DIAGNOSIS — F43.9 HIGH PERCEIVED STRESS: ICD-10-CM

## 2023-10-02 DIAGNOSIS — K21.9 GASTROESOPHAGEAL REFLUX DISEASE, UNSPECIFIED WHETHER ESOPHAGITIS PRESENT: ICD-10-CM

## 2023-10-02 DIAGNOSIS — R06.02 SOB (SHORTNESS OF BREATH): Primary | ICD-10-CM

## 2023-10-02 LAB
EXPIRATORY TIME: NORMAL
FEF 25-75% %PRED-PRE: NORMAL
FEF 25-75% PRED: NORMAL
FEF 25-75%-PRE: NORMAL
FEV1 %PRED-PRE: 91 %
FEV1 PRED: NORMAL
FEV1/FVC %PRED-PRE: NORMAL
FEV1/FVC PRED: NORMAL
FEV1/FVC: 82 %
FEV1: 2.82 L
FVC %PRED-PRE: 88 %
FVC PRED: NORMAL
FVC: 3.42 L
PEF %PRED-PRE: NORMAL
PEF PRED: NORMAL
PEF-PRE: NORMAL

## 2023-10-02 PROCEDURE — 94010 BREATHING CAPACITY TEST: CPT | Performed by: INTERNAL MEDICINE

## 2023-10-02 PROCEDURE — 99204 OFFICE O/P NEW MOD 45 MIN: CPT | Performed by: NURSE PRACTITIONER

## 2023-10-02 RX ORDER — TRAZODONE HYDROCHLORIDE 50 MG/1
TABLET ORAL
COMMUNITY
Start: 2023-07-28

## 2023-10-02 RX ORDER — PROPRANOLOL HYDROCHLORIDE 20 MG/1
20 TABLET ORAL 3 TIMES DAILY PRN
COMMUNITY
Start: 2021-12-30

## 2023-10-02 RX ORDER — PANTOPRAZOLE SODIUM 40 MG/1
40 TABLET, DELAYED RELEASE ORAL DAILY
Qty: 30 TABLET | Refills: 11 | COMMUNITY
Start: 2023-02-15 | End: 2024-02-15

## 2023-10-02 RX ORDER — SUMATRIPTAN 100 MG/1
TABLET, FILM COATED ORAL
COMMUNITY
Start: 2023-07-19

## 2023-10-02 RX ORDER — CYCLOBENZAPRINE HCL 10 MG
10 TABLET ORAL NIGHTLY PRN
COMMUNITY
Start: 2023-07-26

## 2023-10-02 RX ORDER — ALBUTEROL SULFATE 90 UG/1
2 AEROSOL, METERED RESPIRATORY (INHALATION) 4 TIMES DAILY PRN
Qty: 18 G | Refills: 5 | Status: SHIPPED | OUTPATIENT
Start: 2023-10-02

## 2023-10-02 RX ORDER — SUCRALFATE ORAL 1 G/10ML
1 SUSPENSION ORAL
COMMUNITY
Start: 2022-07-05

## 2023-10-02 RX ORDER — FAMOTIDINE 40 MG/1
TABLET, FILM COATED ORAL
COMMUNITY
Start: 2022-07-05

## 2023-10-02 RX ORDER — LINACLOTIDE 145 UG/1
CAPSULE, GELATIN COATED ORAL
COMMUNITY
Start: 2023-08-02

## 2023-10-02 RX ORDER — PHENDIMETRAZINE TARTRATE 35 MG/1
TABLET ORAL
COMMUNITY
Start: 2023-09-25

## 2023-10-02 RX ORDER — ERGOCALCIFEROL 1.25 MG/1
CAPSULE ORAL
COMMUNITY
Start: 2022-05-06

## 2023-10-02 RX ORDER — ERGOCALCIFEROL 1.25 MG/1
CAPSULE ORAL
COMMUNITY
Start: 2023-07-19

## 2023-10-02 RX ORDER — VITAMIN E 268 MG
400 CAPSULE ORAL DAILY
COMMUNITY

## 2023-10-02 ASSESSMENT — PULMONARY FUNCTION TESTS
FEV1/FVC: 82
FVC: 3.42
FEV1: 2.82
FEV1_PERCENT_PREDICTED_PRE: 91
FVC_PERCENT_PREDICTED_PRE: 88

## 2023-10-02 NOTE — PROGRESS NOTES
Name:  Marisela Grijalva  YOB: 1980   MRN: [id number]      Office Visit: 10/2/2023        ASSESSMENT AND PLAN:  (Medical Decision Making)    Impression: 37 y.o. female referred to [facility] pulmonary for ongoing shortness of breath    1. SOB (shortness of breath)  --Spirometry compared to that of 2019 and overall no significant changes. Chest x-ray is clear. Feno 21. --She has intermittent wheezing, so suspect some airway involvement. --She is on a Zyrtec daily as needed  --Unfortunately no specific answer to this at present. I do feel like her reflux could be contributing to her shortness of breath and concern for reactive airways. She does have tachycardia with mild activity, so I do suspect some debility as well. --We will set her up for methacholine challenge  --We will send albuterol inhaler to use to see if this offers any improvement  - Spirometry Without Bronchodilator    2. Gastroesophageal reflux disease, unspecified whether esophagitis present  --On multiple agents currently and known hiatal hernia pending referral to GI by her primary care. I do think this would be very helpful. She is not currently interested in any surgical interventions at this point    3. High perceived stress  --Patient has known multiple stress inducing situations. Her sister and father both passed within the last 2 years and now her mother has lung cancer. -- Encouraged her to try magnesium tablets both for respiratory symptoms as well as for anxiety. Orders Placed This Encounter   Medications    albuterol sulfate HFA (VENTOLIN HFA) 108 (90 Base) MCG/ACT inhaler     Sig: Inhale 2 puffs into the lungs 4 times daily as needed for Wheezing     Dispense:  18 g     Refill:  5     No orders of the defined types were placed in this encounter. Follow-up and Dispositions    Return in about 4 months (around 2/2/2024) for dyspnea, methacholine, Luiza or Sine.        CARLOS Conte - CNP    No

## 2024-01-10 ENCOUNTER — HOSPITAL ENCOUNTER (OUTPATIENT)
Dept: MAMMOGRAPHY | Age: 44
Discharge: HOME | End: 2024-01-13
Payer: COMMERCIAL

## 2024-01-10 VITALS — WEIGHT: 179 LBS | BODY MASS INDEX: 28.77 KG/M2 | HEIGHT: 66 IN

## 2024-01-10 DIAGNOSIS — N63.0 BREAST LUMP IN UPPER OUTER QUADRANT: ICD-10-CM

## 2024-01-10 PROCEDURE — 76642 ULTRASOUND BREAST LIMITED: CPT

## 2024-01-10 PROCEDURE — G0279 TOMOSYNTHESIS, MAMMO: HCPCS

## 2024-09-04 ENCOUNTER — HOSPITAL ENCOUNTER (OUTPATIENT)
Dept: MAMMOGRAPHY | Age: 44
Discharge: HOME | End: 2024-09-07
Payer: COMMERCIAL

## 2024-09-04 DIAGNOSIS — N63.0 BREAST LUMP IN UPPER OUTER QUADRANT: ICD-10-CM

## 2024-09-04 PROCEDURE — 76642 ULTRASOUND BREAST LIMITED: CPT
